# Patient Record
(demographics unavailable — no encounter records)

---

## 2024-11-07 NOTE — HISTORY OF PRESENT ILLNESS
[In-Place] : has Home Health services in-place [PT] : PT [A] : A [Patient is stable] : patient is stable [Education provided] : education provided [Spouse] : spouse [FreeTextEntry4] : Dr. Rojas (endocrinologist) [LastSpecialistVisitDate] : 5/31/2024 [FreeTextEntry1] : [employer] Enrollment [FreeTextEntry2] : Mrs. Morton is a 70 y/o woman with pmhx of HTN, T2DM, Spinal stenosis s/p surgical intervention, Osteoarthritis in knees, Dementia, and Recurrent UTI who is being seen today for [employer] enrollment. Her spouse, Segundo was present during the entire home visit.  -PCP DR. Zaina Montes De Oca -Endocrine DR. Rojas (f/u on 5/31/24)   - Last hospital admission was a year ago - Incontinent- Uses pull-ups  -mood- cranky at times, "sometimes she's very cranky. Everything you say, she gets mad" -[facility], following up with ENT. - Good appetite, follows diabetic diet  - Sleeps very well, 12-15 hours, no wandering or aggression at night  - Knees give out leading to falls, most recent episode 2 days ago but no impact falls, more assisted falls   11/7/24: Mrs. Lewis is seen today for follow up Last seen May 30, 2024 Since the initial visit: she has been treated for recurrent UTI.  -on 11/2/24, she had a witness fall due to wheelchair malfunction and a +head strike. Ems evaluated; family declined ER visit.  -Denies any neurological changes -two months ago went on a cruise and developed a body rash; currently on tiaminocolone and mupirocin ointment; under the care of dermatology.  She receives physical therapy twice a week. A 4-5 hour private hire.  Appetite is good in the morning; weight is stable

## 2024-11-07 NOTE — REVIEW OF SYSTEMS
[Vision Problems] : vision problems [Hearing Loss] : hearing loss [Joint Pain] : joint pain [Back Pain] : back pain [Skin Rash] : skin rash [Memory Loss] : memory loss [Unsteady Walking] : ataxia [Negative] : Heme/Lymph [de-identified] : bruise on scalp

## 2024-11-07 NOTE — PHYSICAL EXAM
[No Acute Distress] : no acute distress [Normal Voice/Communication] : normal voice communication [Normal Sclera/Conjunctiva] : normal sclera/conjunctiva [PERRL] : pupils equal, round and reactive to light [EOMI] : extra ocular movement intact [Normal Outer Ear/Nose] : the ears and nose were normal in appearance [No JVD] : no jugular venous distention [No Respiratory Distress] : no respiratory distress [Clear to Auscultation] : lungs were clear to auscultation bilaterally [No Accessory Muscle Use] : no accessory muscle use [Normal Rate] : heart rate was normal  [Regular Rhythm] : with a regular rhythm [No Murmurs] : no murmurs heard [Pedal Pulses Present] : the pedal pulses are present [No Edema] : there was no peripheral edema [Breast Exam Declined] : patient declined to have breast exam done [Normal Bowel Sounds] : normal bowel sounds [Non Tender] : non-tender [Soft] : abdomen soft [No Masses] : no abdominal mass palpated [No Clubbing, Cyanosis] : no clubbing  or cyanosis of the fingernails [No Gross Sensory Deficits] : no gross sensory deficits [Foot Ulcers] : no foot ulcers [de-identified] : skin rash observed to back, buttocks, hips and arms. some areas open with dressing over it from itching. Head bruise from fall: no signs of bleeding [de-identified] : alert and oriented x 2

## 2024-11-07 NOTE — HEALTH RISK ASSESSMENT
[Full assistance needed] : managing finances [Two or more falls in past year] : Patient reported two or more falls in the past year

## 2024-11-07 NOTE — CHRONIC CARE ASSESSMENT
[Resistant to using DME in place] : resistant to using DME in place [PPS Score: ____] : Palliative Performance Scale (PPS) Score: [unfilled]

## 2024-11-07 NOTE — COUNSELING
[Non - Smoker] : non-smoker [] : foot exam [Improve mobility] : improve mobility [Maintain functional ability] : maintain functional ability [Full Code] : Code Status: Full Code [_____] : HCP: [unfilled] [FreeTextEntry4] : "Improve mobility"

## 2025-03-14 NOTE — PHYSICAL EXAM
[Skin Ulcer] : ulcer [Alert] : alert [Oriented to Person] : oriented to person [Oriented to Place] : oriented to place [Oriented to Time] : oriented to time [Calm] : calm [Please See PDF for [employer]] : Please See PDF for [employer]. [de-identified] : NAD [de-identified] : AT [de-identified] : supple [de-identified] : equal chest rise [de-identified] : soft NT [de-identified] : wheelchair

## 2025-03-14 NOTE — PLAN
[FreeTextEntry1] : 3-14-25: Plan: B/L buttocks: wash with soap and water (Dove sensitive or J&J baby shampoo) zinc bid and prn stop diapers.  rec cotton underwear and incontinence pads.  Rec attends pads at night rec gel mateix cushion.  leaves scabs open to air rec trial of changing detergent.  f/u 2-3 weeks

## 2025-03-14 NOTE — REASON FOR VISIT
[Consultation] : a consultation visit [Family Member] : family member [FreeTextEntry1] : buttocks wound

## 2025-03-14 NOTE — HISTORY OF PRESENT ILLNESS
[FreeTextEntry1] : 3-14-25 Ms. DARELL COWART   presents to the office with a wound for 2-3 months duration.  The wound is located on the buttocks .  The patient has complaints of non healing.   The patient has been dressing the wound with desitin/aquaphor.  The patient denies fevers or chills.  The patient has localized pain to the wound upon dressing changes.  The patient has no other complaints or associated symptoms.  HbA1c 6.1 Accompanied by daughter  Pt seen and followed by derm for wounds and recommended be seen at wound care for buttocks wound.  As per daughter derm feels buttock wound is infected and started on abx yesterday..  As per daughter derm has said pt has eczema and scratches the skin alot  Pt has dementia.  Pt walks with assistance.  Pt does ot have a cushion on the wheelchair. Pt is incontinent of urine and stool.

## 2025-03-14 NOTE — REVIEW OF SYSTEMS
[As Noted in HPI] : as noted in HPI [Skin Wound] : skin wound [Negative] : Psychiatric [FreeTextEntry9] : wheelchair